# Patient Record
Sex: MALE | Race: OTHER | Employment: FULL TIME | URBAN - METROPOLITAN AREA
[De-identification: names, ages, dates, MRNs, and addresses within clinical notes are randomized per-mention and may not be internally consistent; named-entity substitution may affect disease eponyms.]

---

## 2020-08-11 ENCOUNTER — HOSPITAL ENCOUNTER (EMERGENCY)
Age: 20
Discharge: HOME | End: 2020-08-11
Attending: STUDENT IN AN ORGANIZED HEALTH CARE EDUCATION/TRAINING PROGRAM | Admitting: STUDENT IN AN ORGANIZED HEALTH CARE EDUCATION/TRAINING PROGRAM
Payer: COMMERCIAL

## 2020-08-11 ENCOUNTER — APPOINTMENT (OUTPATIENT)
Dept: GENERAL RADIOLOGY | Age: 20
End: 2020-08-11
Attending: PHYSICIAN ASSISTANT
Payer: COMMERCIAL

## 2020-08-11 VITALS
TEMPERATURE: 98.1 F | DIASTOLIC BLOOD PRESSURE: 92 MMHG | WEIGHT: 190 LBS | SYSTOLIC BLOOD PRESSURE: 134 MMHG | RESPIRATION RATE: 20 BRPM | OXYGEN SATURATION: 96 % | BODY MASS INDEX: 28.79 KG/M2 | HEIGHT: 68 IN | HEART RATE: 68 BPM

## 2020-08-11 DIAGNOSIS — S50.11XA CONTUSION OF RIGHT FOREARM, INITIAL ENCOUNTER: Primary | ICD-10-CM

## 2020-08-11 PROCEDURE — 73110 X-RAY EXAM OF WRIST: CPT

## 2020-08-11 PROCEDURE — 99284 EMERGENCY DEPT VISIT MOD MDM: CPT

## 2020-08-11 PROCEDURE — 74011250637 HC RX REV CODE- 250/637: Performed by: PHYSICIAN ASSISTANT

## 2020-08-11 PROCEDURE — 73090 X-RAY EXAM OF FOREARM: CPT

## 2020-08-11 RX ORDER — IBUPROFEN 800 MG/1
800 TABLET ORAL
Status: COMPLETED | OUTPATIENT
Start: 2020-08-11 | End: 2020-08-11

## 2020-08-11 RX ADMIN — IBUPROFEN 800 MG: 800 TABLET ORAL at 13:15

## 2020-08-11 NOTE — DISCHARGE INSTRUCTIONS
Patient Education     Return for new or worsening symptoms such as worsening swelling, numbness in the hand, blue discoloration. You may take ibuprofen, 3 to 4 tablets every 6-8 hours, as needed for pain. Elevate the arm is much as possible. Apply ice for 20 to 30 minutes, then take off for 90 minutes, do this while awake. If not improving in 5 to 7 days, or if worsening, make a follow-up appointment with the orthopedist.     Bruises: Care Instructions  Your Care Instructions     Bruises occur when small blood vessels under the skin tear or rupture, most often from a twist, bump, or fall. Blood leaks into tissues under the skin and causes a black-and-blue spot that often turns colors, including purplish black, reddish blue, or yellowish green, as the bruise heals. Bruises hurt, but most are not serious and will go away on their own within 2 to 4 weeks. Sometimes, gravity causes them to spread down the body. A leg bruise usually will take longer to heal than a bruise on the face or arms. Follow-up care is a key part of your treatment and safety. Be sure to make and go to all appointments, and call your doctor if you are having problems. It's also a good idea to know your test results and keep a list of the medicines you take. How can you care for yourself at home? · Take pain medicines exactly as directed. ? If the doctor gave you a prescription medicine for pain, take it as prescribed. ? If you are not taking a prescription pain medicine, ask your doctor if you can take an over-the-counter medicine. · Put ice or a cold pack on the area for 10 to 20 minutes at a time. Put a thin cloth between the ice and your skin. · If you can, prop up the bruised area on pillows as much as possible for the next few days. Try to keep the bruise above the level of your heart. When should you call for help?    Call your doctor now or seek immediate medical care if:  · You have signs of infection, such as:  ? Increased pain, swelling, warmth, or redness. ? Red streaks leading from the bruise. ? Pus draining from the bruise. ? A fever. · You have a bruise on your leg and signs of a blood clot, such as:  ? Increasing redness and swelling along with warmth, tenderness, and pain in the bruised area. ? Pain in your calf, back of the knee, thigh, or groin. ? Redness and swelling in your leg or groin. · Your pain gets worse. Watch closely for changes in your health, and be sure to contact your doctor if:  · You do not get better as expected. Where can you learn more? Go to http://pipo-chary.info/  Enter T177 in the search box to learn more about \"Bruises: Care Instructions. \"  Current as of: June 26, 2019               Content Version: 12.5  © 0364-1754 [employer]. Care instructions adapted under license by [employer] (which disclaims liability or warranty for this information). If you have questions about a medical condition or this instruction, always ask your healthcare professional. [place] [address] any warranty or liability for your use of this information.

## 2020-08-11 NOTE — ED TRIAGE NOTES
Pt reports he was on a ladder working on the vinyl siding of the second story of a house today about 40 minutes ago when the ladder slid down the side of the house and fell onto his right arm. Denies hitting his head or LOC. Pt now reports right arm and wrist and right leg pain secondary to the fall. Abrasions noted to the right leg. Pt denies any other complaints. No evidence of other trauma. Provider in triage evaluating pt.

## 2020-08-11 NOTE — ED PROVIDER NOTES
80-year-old male no medical history presenting to the ED for right arm pain. Patient is right-handed. Patient notes that about 40 minutes prior to arrival he was on a ladder working on vinyl siding on the second story of a house when the ladder slid down the house, causing him to fall. Patient reports that he prevented himself from hitting his face by breaking the fall with his right arm. Patient denies head trauma or loss of consciousness. Denies chest or abdominal pain. Denies neck or back pain. Has a superficial abrasion to the right thigh. No hip pain. Patient has been ambulatory since the fall and has not taken any medications. No difficulty breathing. Notes moderately severe pain of the right forearm, midshaft, radiates into the hand, worse with any movement. Past medical history: Denies  Social history: As above. Non-smoker. No past medical history on file. No past surgical history on file. No family history on file.     Social History     Socioeconomic History    Marital status: SINGLE     Spouse name: Not on file    Number of children: Not on file    Years of education: Not on file    Highest education level: Not on file   Occupational History    Not on file   Social Needs    Financial resource strain: Not on file    Food insecurity     Worry: Not on file     Inability: Not on file    Transportation needs     Medical: Not on file     Non-medical: Not on file   Tobacco Use    Smoking status: Not on file   Substance and Sexual Activity    Alcohol use: Not on file    Drug use: Not on file    Sexual activity: Not on file   Lifestyle    Physical activity     Days per week: Not on file     Minutes per session: Not on file    Stress: Not on file   Relationships    Social connections     Talks on phone: Not on file     Gets together: Not on file     Attends Oriental orthodox service: Not on file     Active member of club or organization: Not on file     Attends meetings of clubs or organizations: Not on file     Relationship status: Not on file    Intimate partner violence     Fear of current or ex partner: Not on file     Emotionally abused: Not on file     Physically abused: Not on file     Forced sexual activity: Not on file   Other Topics Concern    Not on file   Social History Narrative    Not on file         ALLERGIES: Patient has no known allergies. Review of Systems   Constitutional: Negative for fever. HENT: Negative for facial swelling. Respiratory: Negative for shortness of breath. Cardiovascular: Negative for chest pain. Gastrointestinal: Negative for vomiting. Musculoskeletal:        + Right arm pain   Skin: Positive for wound. Neurological: Negative for syncope. All other systems reviewed and are negative. Vitals:    08/11/20 1254 08/11/20 1319   BP: (!) 134/92    Pulse: 68    Resp: 20    Temp: 98.1 °F (36.7 °C)    SpO2: 96% 96%   Weight: 86.2 kg (190 lb)    Height: 5' 8\" (1.727 m)             Physical Exam  Vitals signs and nursing note reviewed. Constitutional:       General: He is not in acute distress. Appearance: He is well-developed. Comments: Pleasant, talkative, well-appearing  male   HENT:      Head:      Comments: Head visualized, palpated, no evidence of trauma     Right Ear: External ear normal.      Left Ear: External ear normal.   Eyes:      Pupils: Pupils are equal, round, and reactive to light. Neck:      Musculoskeletal: Normal range of motion and neck supple. Comments: No midline C, T, L-spine tenderness  Cardiovascular:      Rate and Rhythm: Normal rate and regular rhythm. Heart sounds: Normal heart sounds. No murmur. No friction rub. No gallop. Pulmonary:      Effort: Pulmonary effort is normal. No respiratory distress. Breath sounds: Normal breath sounds. No wheezing. Abdominal:      Palpations: Abdomen is soft. Tenderness: There is no abdominal tenderness. There is no guarding. Comments: Chest and abdomen exposed, palpated, no bruising or tenderness noted   Musculoskeletal: Normal range of motion. Comments: No pelvis tenderness  Right upper extremity: Swelling and tenderness noted of the mid forearm, no tenderness of the elbow or proximal upper extremity, mild tenderness of the distal forearm and wrist, unremarkable hand, normal radial pulse, neurovascular function   Skin:     General: Skin is warm and dry. Neurological:      Mental Status: He is alert. MDM  Number of Diagnoses or Management Options  Contusion of right forearm, initial encounter:   Diagnosis management comments: 63-year-old male presenting to the ED for right forearm pain after a fall sustained when a ladder that he was on slid down a house. Patient denies falling off of the ladder from any significant height. Patient with what appears to be an isolated injury of the right forearm, will order x-ray, given somewhat more significant mechanism will observe patient. No fracture on x-ray, patient feeling much better after ice and ibuprofen in the ED. No further complaints after observation in the ED. Discussed likely contusion with patient, no evidence of neurovascular injury, compartment syndrome at this time, arm is moderately tender but supple with normal distal perfusion, sensation, motor function. Encouraged use of rice, Ortho follow-up and return precautions given. Amount and/or Complexity of Data Reviewed  Tests in the radiology section of CPT®: ordered and reviewed  Discuss the patient with other providers: yes (Dr. Nick Mccarthy, ED attending)  Independent visualization of images, tracings, or specimens: yes (XR)           Procedures                 Patient reassessed, sleeping comfortably. Easily aroused, reports that overall he is feeling much better. Discussed results with patient, likely contusion.   Encouraged use of immobilizer, rice, NSAID, orthopedic follow-up if symptoms worsen.   RUDDY Dumont  3:01 PM

## 2021-12-23 ENCOUNTER — APPOINTMENT (OUTPATIENT)
Dept: GENERAL RADIOLOGY | Age: 21
End: 2021-12-23
Attending: NURSE PRACTITIONER

## 2021-12-23 ENCOUNTER — HOSPITAL ENCOUNTER (EMERGENCY)
Age: 21
Discharge: ANOTHER HOSPITAL | End: 2021-12-23
Attending: EMERGENCY MEDICINE
Payer: COMMERCIAL

## 2021-12-23 ENCOUNTER — HOSPITAL ENCOUNTER (EMERGENCY)
Age: 21
Discharge: ANOTHER HOSPITAL | End: 2021-12-23
Attending: EMERGENCY MEDICINE

## 2021-12-23 VITALS
BODY MASS INDEX: 29.62 KG/M2 | HEART RATE: 116 BPM | OXYGEN SATURATION: 96 % | HEIGHT: 69 IN | SYSTOLIC BLOOD PRESSURE: 147 MMHG | RESPIRATION RATE: 16 BRPM | WEIGHT: 200 LBS | TEMPERATURE: 98.4 F | DIASTOLIC BLOOD PRESSURE: 94 MMHG

## 2021-12-23 DIAGNOSIS — S68.012A: Primary | ICD-10-CM

## 2021-12-23 DIAGNOSIS — T14.90XA TRAUMA: Primary | ICD-10-CM

## 2021-12-23 DIAGNOSIS — S61.012A THUMB LACERATION, LEFT, INITIAL ENCOUNTER: ICD-10-CM

## 2021-12-23 LAB
COMMENT, HOLDF: NORMAL
SAMPLES BEING HELD,HOLD: NORMAL

## 2021-12-23 PROCEDURE — 74011250636 HC RX REV CODE- 250/636: Performed by: NURSE PRACTITIONER

## 2021-12-23 PROCEDURE — 90471 IMMUNIZATION ADMIN: CPT

## 2021-12-23 PROCEDURE — 96374 THER/PROPH/DIAG INJ IV PUSH: CPT

## 2021-12-23 PROCEDURE — 99282 EMERGENCY DEPT VISIT SF MDM: CPT

## 2021-12-23 PROCEDURE — 90715 TDAP VACCINE 7 YRS/> IM: CPT | Performed by: NURSE PRACTITIONER

## 2021-12-23 RX ORDER — MORPHINE SULFATE 4 MG/ML
4 INJECTION INTRAVENOUS ONCE
Status: COMPLETED | OUTPATIENT
Start: 2021-12-23 | End: 2021-12-23

## 2021-12-23 RX ADMIN — MORPHINE SULFATE 4 MG: 4 INJECTION INTRAVENOUS at 10:58

## 2021-12-23 RX ADMIN — SODIUM CHLORIDE 1000 ML: 9 INJECTION, SOLUTION INTRAVENOUS at 10:55

## 2021-12-23 RX ADMIN — TETANUS TOXOID, REDUCED DIPHTHERIA TOXOID AND ACELLULAR PERTUSSIS VACCINE, ADSORBED 0.5 ML: 5; 2.5; 8; 8; 2.5 SUSPENSION INTRAMUSCULAR at 10:49

## 2021-12-23 NOTE — ED TRIAGE NOTES
Pt walks in the EMS bay crying that he cut his thumb off with a saw. Bleeding a lot at this time. Tourniquet applied at this time. Trauma alert.

## 2021-12-25 NOTE — ED PROVIDER NOTES
This is a 66-year-old male who presents ambulatory to the emergency room with complaints of left thumb injury. Patient states he was working with a saw and his friend sliced through his left thumb causing it almost to be completely severed off. Holding pressure on while he comes through the emergency room. States it happened approximately 5 minutes prior to arrival.  Unknown last tetanus shot. Denies any further injury. Has not taken any medication prior to arrival.  Is not on blood thinners. There are no further complaints at this time. None  No past medical history on file. No past surgical history on file. No past medical history on file. No past surgical history on file. No family history on file. Social History     Socioeconomic History    Marital status: SINGLE     Spouse name: Not on file    Number of children: Not on file    Years of education: Not on file    Highest education level: Not on file   Occupational History    Not on file   Tobacco Use    Smoking status: Not on file    Smokeless tobacco: Not on file   Substance and Sexual Activity    Alcohol use: Not on file    Drug use: Not on file    Sexual activity: Not on file   Other Topics Concern    Not on file   Social History Narrative    Not on file     Social Determinants of Health     Financial Resource Strain:     Difficulty of Paying Living Expenses: Not on file   Food Insecurity:     Worried About Running Out of Food in the Last Year: Not on file    Fredo of Food in the Last Year: Not on file   Transportation Needs:     Lack of Transportation (Medical): Not on file    Lack of Transportation (Non-Medical):  Not on file   Physical Activity:     Days of Exercise per Week: Not on file    Minutes of Exercise per Session: Not on file   Stress:     Feeling of Stress : Not on file   Social Connections:     Frequency of Communication with Friends and Family: Not on file    Frequency of Social Gatherings with Friends and Family: Not on file    Attends Restorationism Services: Not on file    Active Member of Clubs or Organizations: Not on file    Attends Club or Organization Meetings: Not on file    Marital Status: Not on file   Intimate Partner Violence:     Fear of Current or Ex-Partner: Not on file    Emotionally Abused: Not on file    Physically Abused: Not on file    Sexually Abused: Not on file   Housing Stability:     Unable to Pay for Housing in the Last Year: Not on file    Number of [place] in the Last Year: Not on file    Unstable Housing in the Last Year: Not on file         ALLERGIES: Patient has no known allergies. Review of Systems   Constitutional: Negative for activity change, appetite change, chills, fatigue and fever. HENT: Negative for congestion, ear discharge, ear pain, sinus pressure, sinus pain, sore throat and trouble swallowing. Eyes: Negative for photophobia, pain, redness, itching and visual disturbance. Respiratory: Negative for chest tightness and shortness of breath. Cardiovascular: Negative for chest pain and palpitations. Gastrointestinal: Negative for abdominal distention, abdominal pain, nausea and vomiting. Endocrine: Negative. Genitourinary: Negative for difficulty urinating, frequency and urgency. Musculoskeletal: Positive for arthralgias (left  thumb). Negative for back pain, neck pain and neck stiffness. Skin: Positive for wound (left thumb). Negative for color change, pallor and rash. Allergic/Immunologic: Negative. Neurological: Negative for dizziness, syncope, weakness and headaches. Hematological: Does not bruise/bleed easily. Psychiatric/Behavioral: Negative for behavioral problems. The patient is nervous/anxious. There were no vitals filed for this visit. Physical Exam  Vitals and nursing note reviewed. Constitutional:       General: He is in acute distress. Appearance: Normal appearance.  He is well-developed. HENT:      Head: Normocephalic and atraumatic. Right Ear: External ear normal.      Left Ear: External ear normal.      Nose: Nose normal. No congestion. Mouth/Throat:      Mouth: Mucous membranes are moist.   Eyes:      General:         Right eye: No discharge. Left eye: No discharge. Conjunctiva/sclera: Conjunctivae normal.      Pupils: Pupils are equal, round, and reactive to light. Neck:      Vascular: No JVD. Trachea: No tracheal deviation. Cardiovascular:      Rate and Rhythm: Normal rate and regular rhythm. Pulses: Normal pulses. Heart sounds: Normal heart sounds. No murmur heard. No gallop. Pulmonary:      Effort: Pulmonary effort is normal. No respiratory distress. Breath sounds: Normal breath sounds. No wheezing or rales. Chest:      Chest wall: No tenderness. Abdominal:      General: Bowel sounds are normal. There is no distension. Palpations: Abdomen is soft. Tenderness: There is no abdominal tenderness. There is no guarding or rebound. Genitourinary:     Comments: Deferred    Musculoskeletal:         General: Tenderness (left thumb) present. Normal range of motion. Cervical back: Normal range of motion and neck supple. Skin:     Coloration: Skin is not pale. Findings: No erythema or rash. Comments: Left thumb with injury, almost completely severed circumferentially. Bone exposed. Left fourth finger with 2 small lacerations that are hemostatic. Neurological:      Mental Status: He is alert and oriented to person, place, and time. Motor: No weakness. Coordination: Coordination normal.   Psychiatric:         Mood and Affect: Mood normal.         Behavior: Behavior normal.         Thought Content:  Thought content normal.         Judgment: Judgment normal.          MDM  Number of Diagnoses or Management Options  Diagnosis management comments: Differential diagnosis includes thumb fracture, incomplete amputation, laceration and others. After physical examination, patient was transferred to [facility].  In the emergency room a tourniquet was placed secondary to increased bleeding. Pulse checks per protocol. Tourniquet released per protocol. Accepted by Dr. Bella Castro at [facility].  [place] [facility] as [facility] unavailable. Patient in agreement with plan of care. Discussed with Dr. Angeles Botello who is in agreement with plan of care. 1139: Patient discharged to [facility] for incomplete amputation, hand surgery consult and trauma work-up. Patient in agreement with plan of care. Discussed with Dr. Angeles Botello who is in agreement with plan of care.   Procedures

## 2022-04-15 NOTE — ED PROVIDER NOTES
This is a 26-year-old male who presents ambulatory to the emergency room with complaints of a left thumb partial amputation. Patient arrives through the ambulance bay entrance screaming he cut his thumb off with a saw. Large amounts of bleeding. Patient was pressure to the site. Tourniquet immediately applied. Patient states he was trying to cut a piece of wood when he lost control of the saw resulting in a partial traumatic amputation of the left thumb. Positive pulses in the left hand. Trauma center called. Unknown last tetanus. There are no further injuries. No primary care provider on file. No past medical history on file. No past surgical history on file. No past medical history on file. No past surgical history on file. No family history on file. Social History     Socioeconomic History    Marital status: Not on file     Spouse name: Not on file    Number of children: Not on file    Years of education: Not on file    Highest education level: Not on file   Occupational History    Not on file   Tobacco Use    Smoking status: Not on file    Smokeless tobacco: Not on file   Substance and Sexual Activity    Alcohol use: Not on file    Drug use: Not on file    Sexual activity: Not on file   Other Topics Concern    Not on file   Social History Narrative    Not on file     Social Determinants of Health     Financial Resource Strain:     Difficulty of Paying Living Expenses: Not on file   Food Insecurity:     Worried About Running Out of Food in the Last Year: Not on file    Mehrdad of Food in the Last Year: Not on file   Transportation Needs:     Lack of Transportation (Medical): Not on file    Lack of Transportation (Non-Medical):  Not on file   Physical Activity:     Days of Exercise per Week: Not on file    Minutes of Exercise per Session: Not on file   Stress:     Feeling of Stress : Not on file   Social Connections:     Frequency of Communication with Friends and Family: Not on file    Frequency of Social Gatherings with Friends and Family: Not on file    Attends Orthodoxy Services: Not on file    Active Member of Clubs or Organizations: Not on file    Attends Club or Organization Meetings: Not on file    Marital Status: Not on file   Intimate Partner Violence:     Fear of Current or Ex-Partner: Not on file    Emotionally Abused: Not on file    Physically Abused: Not on file    Sexually Abused: Not on file   Housing Stability:     Unable to Pay for Housing in the Last Year: Not on file    Number of [place] in the Last Year: Not on file    Unstable Housing in the Last Year: Not on file         ALLERGIES: Patient has no known allergies. Review of Systems   Constitutional: Negative for activity change. HENT: Negative for congestion and sore throat. Eyes: Negative for visual disturbance. Respiratory: Negative for shortness of breath. Musculoskeletal: Positive for arthralgias (left thumb with traumatic partial amputation). Skin: Positive for wound (left thumb partial traumatic amputation). Neurological: Negative for dizziness. Hematological: Does not bruise/bleed easily. Psychiatric/Behavioral: The patient is nervous/anxious. Vitals:    12/23/21 1032 12/23/21 1050 12/23/21 1100   BP: (!) 128/37  (!) 147/94   Pulse: (!) 116     Resp: 16     Temp: 98.4 °F (36.9 °C)     SpO2: 96%  96%   Weight:  90.7 kg (200 lb)    Height:  5' 9\" (1.753 m)             Physical Exam  Constitutional:       General: He is in acute distress. Appearance: Normal appearance. HENT:      Head: Normocephalic. Nose: Nose normal.      Mouth/Throat:      Mouth: Mucous membranes are moist.   Eyes:      Pupils: Pupils are equal, round, and reactive to light. Cardiovascular:      Rate and Rhythm: Tachycardia present. Pulses: Normal pulses. Pulmonary:      Effort: Pulmonary effort is normal.   Abdominal:      Tenderness:  There is no abdominal tenderness. Genitourinary:     Comments: Deferred    Musculoskeletal:         General: Tenderness and signs of injury present. Cervical back: Normal range of motion and neck supple. Comments: Traumatic partial amputation of left thumb. Skin:     Capillary Refill: Unable to measure capillary refill of the left thumb. Otherwise capillary refill 2 to 3 seconds in the other fingers. Comments: Pressure applied to the partial traumatic amputation of the left thumb. Continues to bleed. Tourniquet applied. Neurological:      Mental Status: He is alert. Psychiatric:      Comments: Anxious appearing male. MDM  Number of Diagnoses or Management Options  Thumb laceration, left, initial encounter: new and requires workup  Trauma: new and requires workup  Diagnosis management comments: Differential diagnosis includes traumatic partial amputation of the left thumb. Trauma center immediately called. Pressure applied, tourniquet applied and checked frequently per policy. After bleeding controlled, tourniquet released. Transfer to trauma center for evaluation of the traumatic partial amputation of the left thumb. Discussed with Dr. Romelle Apgar who is in agreement with plan of care. 1050: Patient transferred to trauma center after acceptance by trauma surgery and emergency room. Patient in agreement with plan of care. Dr. Romelle Apgar in agreement with plan of care.   Procedures